# Patient Record
Sex: MALE | Race: BLACK OR AFRICAN AMERICAN | NOT HISPANIC OR LATINO | Employment: UNEMPLOYED | URBAN - METROPOLITAN AREA
[De-identification: names, ages, dates, MRNs, and addresses within clinical notes are randomized per-mention and may not be internally consistent; named-entity substitution may affect disease eponyms.]

---

## 2020-11-12 ENCOUNTER — HOSPITAL ENCOUNTER (EMERGENCY)
Facility: HOSPITAL | Age: 25
Discharge: HOME | End: 2020-11-12
Attending: EMERGENCY MEDICINE
Payer: MEDICAID

## 2020-11-12 VITALS
SYSTOLIC BLOOD PRESSURE: 128 MMHG | BODY MASS INDEX: 23.7 KG/M2 | OXYGEN SATURATION: 99 % | DIASTOLIC BLOOD PRESSURE: 64 MMHG | HEART RATE: 76 BPM | HEIGHT: 72 IN | WEIGHT: 175 LBS | TEMPERATURE: 98 F | RESPIRATION RATE: 18 BRPM

## 2020-11-12 DIAGNOSIS — T14.90XA INJURY: Primary | ICD-10-CM

## 2020-11-12 DIAGNOSIS — M25.571 ACUTE RIGHT ANKLE PAIN: ICD-10-CM

## 2020-11-12 DIAGNOSIS — S93.401A SPRAIN OF RIGHT ANKLE, UNSPECIFIED LIGAMENT, INITIAL ENCOUNTER: ICD-10-CM

## 2020-11-12 PROCEDURE — 99283 EMERGENCY DEPT VISIT LOW MDM: CPT | Mod: 25

## 2020-11-12 RX ORDER — NAPROXEN 500 MG/1
500 TABLET ORAL EVERY 12 HOURS PRN
Qty: 14 TABLET | Refills: 0 | Status: SHIPPED | OUTPATIENT
Start: 2020-11-12

## 2020-11-12 NOTE — DISCHARGE INSTRUCTIONS
Utilize the crutches to help with walking.  Take the naproxen as needed for pain control.  Follow the rice instructions I have provided.  Follow-up with your primary care provider.  Return to the ER as needed.

## 2020-11-12 NOTE — ED NOTES
Care of patient accepted from [facility]. Patient remains A&Ox3, VSS, NAD. Safety precautions in place. Will continue to provide care accordingly

## 2020-11-12 NOTE — ED PROVIDER NOTES
Encounter Date: 11/12/2020       History     Chief Complaint   Patient presents with    Ankle Pain     R ankle, injured yesterday playing basketball     25-year-old male presents emergency department with complaint of ankle pain.  The patient states he was playing basketball yesterday and rolled his ankle forward and inward.  This occurred yesterday.  The patient isolates the injury to the right foot and ankle.  The pain is constant, aching in nature, 7/10 on the pain scale.  Worsened by weight-bearing and alleviated by nothing.  He has taken no medications to treat the symptoms.  He denies any other symptoms under review of systems.  Patient has an allergy to penicillin.  He denies any medical history.    The history is provided by the patient. No  was used.   Ankle Pain  This is a new problem. The current episode started yesterday. The problem occurs constantly. The problem has not changed since onset.Pertinent negatives include no chest pain, no abdominal pain, no headaches and no shortness of breath. The symptoms are aggravated by walking and bending. Nothing relieves the symptoms. He has tried nothing for the symptoms. The treatment provided no relief.     Review of patient's allergies indicates:   Allergen Reactions    Pcn [penicillins]     Wasp sting [allergen ext-venom-honey bee]      No past medical history on file.  No past surgical history on file.  No family history on file.  Social History     Tobacco Use    Smoking status: Not on file   Substance Use Topics    Alcohol use: Not on file    Drug use: Not on file     Review of Systems   Constitutional: Negative for chills and fever.   HENT: Negative for rhinorrhea and sore throat.    Respiratory: Negative for cough and shortness of breath.    Cardiovascular: Negative for chest pain.   Gastrointestinal: Negative for abdominal pain, diarrhea, nausea and vomiting.   Genitourinary: Negative for difficulty urinating.   Musculoskeletal:  Positive for arthralgias, gait problem and joint swelling. Negative for back pain and neck pain.   Skin: Negative for rash.   Neurological: Negative for dizziness, weakness and headaches.   All other systems reviewed and are negative.      Physical Exam     Initial Vitals [11/12/20 1113]   BP Pulse Resp Temp SpO2   130/63 79 18 97.7 °F (36.5 °C) 98 %      MAP       --         Physical Exam    Nursing note and vitals reviewed.  Constitutional: He appears well-developed and well-nourished. He is not diaphoretic. No distress.   HENT:   Head: Normocephalic and atraumatic.   Right Ear: External ear normal.   Left Ear: External ear normal.   Nose: Nose normal.   Eyes: Conjunctivae and EOM are normal. Right eye exhibits no discharge. Left eye exhibits no discharge.   Neck: Normal range of motion. Neck supple. No tracheal deviation present. No JVD present.   Cardiovascular: Normal rate, regular rhythm, normal heart sounds and intact distal pulses. Exam reveals no gallop and no friction rub.    No murmur heard.  Pulmonary/Chest: Breath sounds normal. No stridor. No respiratory distress. He has no wheezes. He has no rhonchi. He has no rales. He exhibits no tenderness.   Abdominal: Soft. Bowel sounds are normal. He exhibits no distension and no mass. There is no abdominal tenderness. There is no rebound and no guarding.   Musculoskeletal: Normal range of motion. No edema.        Right ankle: He exhibits swelling. Tenderness. Head of 5th metatarsal tenderness found.        Right foot: Tenderness and bony tenderness present.        Feet:    Neurological: He is alert and oriented to person, place, and time. He has normal strength. No sensory deficit. GCS score is 15. GCS eye subscore is 4. GCS verbal subscore is 5. GCS motor subscore is 6.   Skin: Skin is warm and dry. No rash noted. No erythema.   Psychiatric: He has a normal mood and affect. Thought content normal.         ED Course   Procedures  Labs Reviewed - No data to  display       Imaging Results          X-Ray Foot Complete Right (Final result)  Result time 11/12/20 11:54:31    Final result by Milla Ramos MD (11/12/20 11:54:31)                 Narrative:    Right foot 3 views    Clinical data: Pain    FINDINGS:Three views are negative for fracture or dislocation. No  osseous destructive lesion or significant arthritic change is  identified. Soft tissues are unremarkable.    IMPRESSION:    1. Normal right foot.    Electronically Signed by Milla Ramos M.D. on 11/12/2020 12:10 PM                             X-Ray Ankle Complete Right (Final result)  Result time 11/12/20 11:53:28    Final result by Milla Ramos MD (11/12/20 11:53:28)                 Narrative:    Right ankle 3 views    Clinical data: Pain    FINDINGS: 3 views are negative for fracture or dislocation. No osseous  destructive process or significant arthritic change is identified. The  ankle mortise is normal. Soft tissues are unremarkable.    IMPRESSION:    1. Normal right ankle    Electronically Signed by Milla Ramos M.D. on 11/12/2020 12:09 PM                               Medical Decision Making:   Clinical Tests:   Radiological Study: Ordered and Reviewed  ED Management:  No acute findings were noted on the patient's radiologic study.  The patient has been fitted with an Ace wrap and crutches.  He has been given instructions on usage.  I have written prescription for naproxen to take as needed for pain control.  Patient instructed to follow-up with primary care for further evaluation should pain persist.  He has been instructed to follow-up with emergency department if symptoms persist.  I have discussed the case with Dr Rod, he approves of the plan of care.  Pt is being discharged and will f/u in clinic for further treatment and evaluation.                              Clinical Impression:       ICD-10-CM ICD-9-CM   1. Injury  T14.90XA 959.9   2. Sprain of right ankle, unspecified  ligament, initial encounter  S93.401A 845.00   3. Acute right ankle pain  M25.571 719.47     338.19                      Disposition:   Disposition: Discharged  Condition: Stable     ED Disposition Condition    Discharge Stable        ED Prescriptions     Medication Sig Dispense Start Date End Date Auth. Provider    naproxen (NAPROSYN) 500 MG tablet Take 1 tablet (500 mg total) by mouth every 12 (twelve) hours as needed. 14 tablet 11/12/2020  Pawel Drummond III, NP        Follow-up Information     Follow up With Specialties Details Why Contact Info Additional Information    [facility] Go to  As needed [address] [facility] [address] [phone] 1st floor    Primary Care  Call today To schedule follow-up                                         Pawel Drummond III, NP  11/12/20 [id number]

## 2020-11-12 NOTE — ED NOTES
"Pt given prescriptions and discharge instructions with emphasis on follow up and "get prompt medical attention if", pt verbalized understanding.  VSS, ABCs intact, A&Ox3. Wheelchair offered for discharge, pt refused.    "